# Patient Record
Sex: MALE | Race: OTHER | Employment: OTHER | URBAN - METROPOLITAN AREA
[De-identification: names, ages, dates, MRNs, and addresses within clinical notes are randomized per-mention and may not be internally consistent; named-entity substitution may affect disease eponyms.]

---

## 2021-04-08 DIAGNOSIS — Z23 ENCOUNTER FOR IMMUNIZATION: ICD-10-CM

## 2024-07-24 LAB
CREAT ?TM UR-SCNC: 115.9 UMOL/L
EXT ALBUMIN URINE RANDOM: 6.7
HBA1C MFR BLD HPLC: 7.3 %
MICROALBUMIN/CREAT UR: 6 MG/G{CREAT}

## 2024-08-14 ENCOUNTER — TELEPHONE (OUTPATIENT)
Dept: ENDOCRINOLOGY | Facility: HOSPITAL | Age: 69
End: 2024-08-14

## 2024-08-14 NOTE — TELEPHONE ENCOUNTER
I called and spoke to the patient to make him aware that he will need to bring and test results with him to his appointment next week.

## 2024-08-20 ENCOUNTER — OFFICE VISIT (OUTPATIENT)
Dept: ENDOCRINOLOGY | Facility: HOSPITAL | Age: 69
End: 2024-08-20
Payer: COMMERCIAL

## 2024-08-20 VITALS
OXYGEN SATURATION: 99 % | HEIGHT: 64 IN | DIASTOLIC BLOOD PRESSURE: 80 MMHG | WEIGHT: 156.4 LBS | SYSTOLIC BLOOD PRESSURE: 126 MMHG | BODY MASS INDEX: 26.7 KG/M2 | HEART RATE: 76 BPM

## 2024-08-20 DIAGNOSIS — E11.65 TYPE 2 DIABETES MELLITUS WITH HYPERGLYCEMIA, WITHOUT LONG-TERM CURRENT USE OF INSULIN (HCC): Primary | ICD-10-CM

## 2024-08-20 DIAGNOSIS — E05.90 SUBCLINICAL HYPERTHYROIDISM: ICD-10-CM

## 2024-08-20 DIAGNOSIS — R79.89 LOW TSH LEVEL: ICD-10-CM

## 2024-08-20 DIAGNOSIS — E66.3 OVERWEIGHT (BMI 25.0-29.9): ICD-10-CM

## 2024-08-20 PROBLEM — R73.03 PREDIABETES: Status: RESOLVED | Noted: 2024-08-20 | Resolved: 2024-08-20

## 2024-08-20 PROBLEM — R73.03 PREDIABETES: Status: ACTIVE | Noted: 2024-08-20

## 2024-08-20 PROCEDURE — 99204 OFFICE O/P NEW MOD 45 MIN: CPT | Performed by: STUDENT IN AN ORGANIZED HEALTH CARE EDUCATION/TRAINING PROGRAM

## 2024-08-20 RX ORDER — SIMVASTATIN 10 MG
10 TABLET ORAL
COMMUNITY

## 2024-08-20 RX ORDER — LISINOPRIL 10 MG/1
10 TABLET ORAL DAILY
COMMUNITY

## 2024-08-20 NOTE — PATIENT INSTRUCTIONS
Keto diet.   <45g carbs per meal.   Coconut sugar or date syrup  Reduce roti's to 2 per meal. Eat 2-3 meals a day. Avoid snacking if possible

## 2024-08-20 NOTE — PROGRESS NOTES
"    New Patient Consult Note      Chief Complaint   Patient presents with    Diabetes Mellitus      Referring Provider  Referral Self  No address on file     History of Present Illness:   Joel Waller is a 69 y.o. male with a history of T2DM since 02/24 with prediabetes prior who presents today to [facility] care    Patient was first diagnosed with prediabetes, Progressed to diabetes in 02/24  Control since diagnosis: suboptimal   Medications tried thus far: below   Current regime:- metformin 500mg BID- recently increased to BID was on daily before   BG control:- morning once a week 100-120  Hypoglycemic episodes: None  Hyperglycemia symptoms: no polyuria or polydipsia\",\"no chest pain, dyspnea or TIAs\",\"no numbness, tingling or pain in extremities\"  Diet: mostly vegetarian diet,   Activity: plays [email or web address]    Eye exam- had exam last year but unsure if had diabetic exam  Hx of hyperlipidemia: yes  Hx of hypertension: Yes  Last Lipid:- 07/24 LDL <100  Last urine microalbumin: 07/24 normal   Last hbA1C: 07/24 7.3%, 0224 7.1%     Patient also found to have low TSH with normal T4 on routine annual labs. TSH 0.28, 4 1.24 on 07/24, TSH 0.314 and Free T4 1.53 02/24. Denies any weight loss, tremors, palpitations. Denies any biotin use, diarrhea.     Social Hx:- denies smoking, alcohol use.   Family HX:- Positive in both parents and brother     Patient Active Problem List   Diagnosis    Low TSH level    Subclinical hyperthyroidism    Overweight (BMI 25.0-29.9)      History reviewed. No pertinent past medical history.   History reviewed. No pertinent surgical history.   Family History   Problem Relation Age of Onset    Diabetes Mother     Diabetes Father     Diabetes Sister     Diabetes Brother     Diabetes Brother      Social History     Tobacco Use    Smoking status: Never    Smokeless tobacco: Never   Substance Use Topics    Alcohol use: Never     No Known Allergies      Current Outpatient Medications:     lisinopril " "(ZESTRIL) 10 mg tablet, Take 10 mg by mouth daily, Disp: , Rfl:     metFORMIN (GLUCOPHAGE) 500 mg tablet, Take 2 tablets (1,000 mg total) by mouth 2 (two) times a day with meals, Disp: 400 tablet, Rfl: 1    simvastatin (ZOCOR) 10 mg tablet, Take 10 mg by mouth daily at bedtime, Disp: , Rfl:   Review of Systems   Constitutional:  Negative for diaphoresis, fatigue and unexpected weight change.   Respiratory:  Negative for shortness of breath.    Cardiovascular:  Negative for chest pain and palpitations.   Gastrointestinal:  Negative for constipation and diarrhea.   Endocrine: Negative for polydipsia and polyuria.       Physical Exam:  Body mass index is 26.85 kg/m².  /80   Pulse 76   Ht 5' 4\" (1.626 m)   Wt 70.9 kg (156 lb 6.4 oz)   SpO2 99%   BMI 26.85 kg/m²    Wt Readings from Last 3 Encounters:   08/20/24 70.9 kg (156 lb 6.4 oz)       Physical Exam  Constitutional:       Appearance: Normal appearance.   Cardiovascular:      Rate and Rhythm: Normal rate and regular rhythm.      Pulses: Normal pulses.   Pulmonary:      Effort: Pulmonary effort is normal.   Abdominal:      General: Abdomen is flat.      Palpations: Abdomen is soft.   Skin:     General: Skin is warm.      Capillary Refill: Capillary refill takes less than 2 seconds.   Neurological:      General: No focal deficit present.      Mental Status: He is alert.         Labs:   above    Impression:  1. Type 2 diabetes mellitus with hyperglycemia, without long-term current use of insulin (HCC)    2. Low TSH level    3. Subclinical hyperthyroidism    4. Overweight (BMI 25.0-29.9)           Plan:    Joel was seen today for diabetes mellitus.    Diagnoses and all orders for this visit:    Type 2 diabetes mellitus with hyperglycemia, without long-term current use of insulin (HCC)  -     Lipid panel; Future  -     Hemoglobin A1C; Future  -     Comprehensive metabolic panel; Future  -     Albumin / creatinine urine ratio; Future  -     T4, free; Future  -  "    TSH, 3rd generation; Future  -     Thyrotropin receptor antibody; Future  -     Thyroid stimulating immunoglobulin; Future    Low TSH level  -     Lipid panel; Future  -     Hemoglobin A1C; Future  -     Comprehensive metabolic panel; Future  -     Albumin / creatinine urine ratio; Future  -     T4, free; Future  -     TSH, 3rd generation; Future  -     Thyrotropin receptor antibody; Future  -     Thyroid stimulating immunoglobulin; Future    Subclinical hyperthyroidism    Overweight (BMI 25.0-29.9)    Other orders  -     metFORMIN (GLUCOPHAGE) 500 mg tablet; Take 2 tablets (1,000 mg total) by mouth 2 (two) times a day with meals      There are no diagnoses linked to this encounter.      Patient is a 69yM With PMHx of T2DM since 02/24 Without any complications with Other PMHx of overweight, low TSH Who presents today for diabetic care.     1) T2DM:- Based on patients A1C, he has T2DM since 02/24 with A1C 7.1%, reports was prediabetic before. Currently on metformin 500mg BID- started on 02/24 and dose increased recently to 500mg BID 2 weeks ago afterHis most recent A1C 07/24 has gotten higher hence recommend c/w current regime. Will refer to CDE for MNT as well. Repeat labs in 3 months for follow up. Review diabetic plate recommendations.     Plan   - c/w metformin 500mg BID   - Work on lifestyle changes   - CDE visit   - Labs in 3 months       Screening:-   Retinopathy- please make sure has dilated eye exam    Nephropathy- UTD  Lipide levels-  UTD    2) Hypertension:- BP in clinic 126/80, c/w lisinopril 10mg     3) Hyperlipidemia:- LDL at goal. C/w zocor 10mg     4) Low TSH- subclinical hyperthyroidism, clinically asymptomatic. Given TSH not persistantly <0.1 or symptomatic or hx of CVD or OP and hence does not need tx. Will obtain repeat labs with TSI/TRAb in 2 months     RTC in 3 months     Discussed with the patient and all questioned fully answered. He will call me if any problems arise.    Counseled patient  on diagnostic results, prognosis, risk and benefit of treatment options, instruction for management, importance of treatment compliance, Risk  factor reduction and impressions      Татьяна Jaeger MD

## 2024-10-25 LAB
ALBUMIN SERPL-MCNC: 4.2 G/DL (ref 3.9–4.9)
ALBUMIN/CREAT UR: <12 MG/G CREAT (ref 0–29)
ALP SERPL-CCNC: 68 IU/L (ref 44–121)
ALT SERPL-CCNC: 21 IU/L (ref 0–44)
AST SERPL-CCNC: 18 IU/L (ref 0–40)
BILIRUB SERPL-MCNC: 0.3 MG/DL (ref 0–1.2)
BUN SERPL-MCNC: 14 MG/DL (ref 8–27)
BUN/CREAT SERPL: 13 (ref 10–24)
CALCIUM SERPL-MCNC: 9.5 MG/DL (ref 8.6–10.2)
CHLORIDE SERPL-SCNC: 104 MMOL/L (ref 96–106)
CHOLEST SERPL-MCNC: 115 MG/DL (ref 100–199)
CO2 SERPL-SCNC: 22 MMOL/L (ref 20–29)
CREAT SERPL-MCNC: 1.07 MG/DL (ref 0.76–1.27)
CREAT UR-MCNC: 25.4 MG/DL
EGFR: 76 ML/MIN/1.73
GLOBULIN SER-MCNC: 2.4 G/DL (ref 1.5–4.5)
GLUCOSE SERPL-MCNC: 95 MG/DL (ref 70–99)
HBA1C MFR BLD: 6.5 % (ref 4.8–5.6)
HDLC SERPL-MCNC: 54 MG/DL
LDLC SERPL CALC-MCNC: 47 MG/DL (ref 0–99)
MICROALBUMIN UR-MCNC: <3 UG/ML
POTASSIUM SERPL-SCNC: 5 MMOL/L (ref 3.5–5.2)
PROT SERPL-MCNC: 6.6 G/DL (ref 6–8.5)
SL AMB VLDL CHOLESTEROL CALC: 14 MG/DL (ref 5–40)
SODIUM SERPL-SCNC: 139 MMOL/L (ref 134–144)
T4 FREE SERPL-MCNC: 1.47 NG/DL (ref 0.82–1.77)
TRIGL SERPL-MCNC: 63 MG/DL (ref 0–149)
TSH RECEP AB SER-ACNC: <1.1 IU/L (ref 0–1.75)
TSH SERPL DL<=0.005 MIU/L-ACNC: 0.41 UIU/ML (ref 0.45–4.5)
TSI SER-ACNC: <0.1 IU/L (ref 0–0.55)

## 2024-10-29 ENCOUNTER — OFFICE VISIT (OUTPATIENT)
Dept: ENDOCRINOLOGY | Facility: HOSPITAL | Age: 69
End: 2024-10-29
Payer: COMMERCIAL

## 2024-10-29 VITALS
BODY MASS INDEX: 26.12 KG/M2 | HEART RATE: 76 BPM | HEIGHT: 64 IN | SYSTOLIC BLOOD PRESSURE: 122 MMHG | OXYGEN SATURATION: 96 % | DIASTOLIC BLOOD PRESSURE: 78 MMHG | WEIGHT: 153 LBS

## 2024-10-29 DIAGNOSIS — E11.65 TYPE 2 DIABETES MELLITUS WITH HYPERGLYCEMIA, WITHOUT LONG-TERM CURRENT USE OF INSULIN (HCC): Primary | ICD-10-CM

## 2024-10-29 DIAGNOSIS — E05.90 SUBCLINICAL HYPERTHYROIDISM: ICD-10-CM

## 2024-10-29 PROCEDURE — 99214 OFFICE O/P EST MOD 30 MIN: CPT | Performed by: PHYSICIAN ASSISTANT

## 2024-10-29 RX ORDER — CETIRIZINE HYDROCHLORIDE 10 MG/1
TABLET ORAL
COMMUNITY
Start: 2024-10-01

## 2024-10-29 RX ORDER — LANCETS 33 GAUGE
EACH MISCELLANEOUS
COMMUNITY
Start: 2024-10-02

## 2024-10-29 RX ORDER — BLOOD-GLUCOSE METER
EACH MISCELLANEOUS
COMMUNITY
Start: 2024-10-02

## 2024-10-29 RX ORDER — DORZOLAMIDE HCL 20 MG/ML
SOLUTION/ DROPS OPHTHALMIC
COMMUNITY
Start: 2024-09-21

## 2024-10-29 RX ORDER — BLOOD SUGAR DIAGNOSTIC
STRIP MISCELLANEOUS
COMMUNITY
Start: 2024-10-27

## 2024-10-29 RX ORDER — MELOXICAM 15 MG/1
15 TABLET ORAL DAILY PRN
COMMUNITY
Start: 2024-10-28

## 2024-10-29 NOTE — PROGRESS NOTES
Joel Waller 69 y.o. male MRN: [id number]    Encounter: [id number]      Assessment & Plan     Assessment:  This is a 69 y.o.-year-old male with type 2 diabetes with hypertension and hyperlipidemia, and subclinical hyperthyroidism.    Plan:  1.  Type 2 diabetes: Recent hemoglobin A1c has improved to 6.5.  He is now at target range.  At this time I like him to continue with metformin 500 mg twice a day.  If A1c is excellent at next office visit, we will likely start backing off on the metformin.  Continue with lifestyle modifications to help improve glucose levels.  Check blood sugars at least daily.  Contact the office with any concerns or questions.  Patient is requesting a 4-month follow-up which I am fine with at this time.    2.  Subclinical hyperthyroidism: Most recent thyroid lab work came back with a slightly low TSH but annelise T4.  Need for medication at this time.  We will continue to monitor.    3.  Hypertension: Normotensive in office today.  Kidney function remained stable with normal electrolytes.  Continue with lisinopril 10 mg daily.  Will repeat CMP prior to next office visit.    4.  Hyperlipidemia: Lipid panel excellent at this time.  Continue with simvastatin 10 mg daily.  We will continue to monitor over time.    CC: Diabetes    History of Present Illness     HPI:  Joel Waller is a 69 y.o. year old male with type 2 diabetes diagnosed February 2024.  He is on oral agents at home and takes metformin 500 mg twice a day. He denies any polyuria, polydipsia, nocturia and blurry vision.  He denies neuropathy, nephropathy, retinopathy, heart attack, stroke, and claudication.  Initial concern for increase in glucose levels and A1c was likely due to recent knee surgery.  Was not as physically active.  Since his recovery has increased physical activity and is much more active at this time.  The vegetarian diet and tries to limit his carbs.    Hypoglycemic episodes: No.     Diabetic eye exam on file.  Diabetic  foot exam has not been completed yet.    Blood Sugar/Glucometer/Pump/CGM review: States when he does check blood sugars are typically in the low 100s.    For hypertension he is currently on lisinopril 10 mg daily.  For hyperlipidemia he is currently on simvastatin 10 mg daily.  Denies any headaches, vision change, chest pain, MI or strokelike symptoms.     Review of Systems   Constitutional:  Negative for activity change, appetite change, fatigue and unexpected weight change.   HENT:  Negative for trouble swallowing.    Eyes:  Negative for visual disturbance.   Respiratory:  Negative for chest tightness and shortness of breath.    Cardiovascular:  Negative for chest pain, palpitations and leg swelling.   Gastrointestinal:  Negative for abdominal pain, diarrhea, nausea and vomiting.   Endocrine: Negative for cold intolerance, heat intolerance, polydipsia, polyphagia and polyuria.   Genitourinary:  Negative for frequency.   Skin:  Negative for rash and wound.   Neurological:  Negative for dizziness, weakness, light-headedness, numbness and headaches.   Psychiatric/Behavioral:  Negative for dysphoric mood and sleep disturbance. The patient is not nervous/anxious.        Historical Information   No past medical history on file.  No past surgical history on file.  Social History   Social History     Substance and Sexual Activity   Alcohol Use Never     Social History     Substance and Sexual Activity   Drug Use Never     Social History     Tobacco Use   Smoking Status Never   Smokeless Tobacco Never     Family History:   Family History   Problem Relation Age of Onset    Diabetes Mother     Diabetes Father     Diabetes Sister     Diabetes Brother     Diabetes Brother        Meds/Allergies   Current Outpatient Medications   Medication Sig Dispense Refill    lisinopril (ZESTRIL) 10 mg tablet Take 10 mg by mouth daily      metFORMIN (GLUCOPHAGE) 500 mg tablet Take 1 tablet (500 mg total) by mouth 2 (two) times a day with  "meals 200 tablet 1    simvastatin (ZOCOR) 10 mg tablet Take 10 mg by mouth daily at bedtime       No current facility-administered medications for this visit.     No Known Allergies    Objective   Vitals: Height 5' 4\" (1.626 m), weight 69.4 kg (153 lb).    Physical Exam  Vitals and nursing note reviewed.   Constitutional:       General: He is not in acute distress.     Appearance: Normal appearance. He is not diaphoretic.   HENT:      Head: Normocephalic and atraumatic.   Eyes:      General: No scleral icterus.     Extraocular Movements: Extraocular movements intact.      Conjunctiva/sclera: Conjunctivae normal.      Pupils: Pupils are equal, round, and reactive to light.   Cardiovascular:      Rate and Rhythm: Normal rate and regular rhythm.      Heart sounds: No murmur heard.  Pulmonary:      Effort: Pulmonary effort is normal. No respiratory distress.      Breath sounds: Normal breath sounds. No wheezing.   Musculoskeletal:      Right lower leg: No edema.      Left lower leg: No edema.   Lymphadenopathy:      Cervical: No cervical adenopathy.   Skin:     General: Skin is warm and dry.   Neurological:      Mental Status: He is alert and oriented to person, place, and time. Mental status is at baseline.      Sensory: No sensory deficit.      Gait: Gait normal.   Psychiatric:         Mood and Affect: Mood normal.         Behavior: Behavior normal.         Thought Content: Thought content normal.         The history was obtained from the review of the chart, patient.    Lab Results:   Lab Results   Component Value Date/Time    Hemoglobin A1C 6.5 (H) 10/23/2024 08:54 AM    Hemoglobin A1C 7.3 07/24/2024 12:00 AM    BUN 14 10/23/2024 08:54 AM    Potassium 5.0 10/23/2024 08:54 AM    Chloride 104 10/23/2024 08:54 AM    CO2 22 10/23/2024 08:54 AM    Creatinine 1.07 10/23/2024 08:54 AM    AST 18 10/23/2024 08:54 AM    ALT 21 10/23/2024 08:54 AM    Protein, Total 6.6 10/23/2024 08:54 AM    Albumin 4.2 10/23/2024 08:54 AM    " "Globulin, Total 2.4 10/23/2024 08:54 AM    HDL 54 10/23/2024 08:54 AM    Triglycerides 63 10/23/2024 08:54 AM           Imaging Studies: Results Review Statement: No pertinent imaging studies reviewed.    Portions of the record may have been created with voice recognition software. Occasional wrong word or \"sound a like\" substitutions may have occurred due to the inherent limitations of voice recognition software. Read the chart carefully and recognize, using context, where substitutions have occurred.    "

## 2024-10-29 NOTE — PATIENT INSTRUCTIONS
Continue with metformin 500 mg twice a day.    Continue with lifestyle to help control blood sugars.    No need for thyroid medications.    Call with any concerns or questions.    Follow up in 3-4 months.

## 2025-03-01 LAB
ALBUMIN SERPL-MCNC: 3.9 G/DL (ref 3.9–4.9)
ALP SERPL-CCNC: 53 IU/L (ref 44–121)
ALT SERPL-CCNC: 24 IU/L (ref 0–44)
AST SERPL-CCNC: 22 IU/L (ref 0–40)
BILIRUB SERPL-MCNC: 0.3 MG/DL (ref 0–1.2)
BUN SERPL-MCNC: 12 MG/DL (ref 8–27)
BUN/CREAT SERPL: 15 (ref 10–24)
CALCIUM SERPL-MCNC: 9.1 MG/DL (ref 8.6–10.2)
CHLORIDE SERPL-SCNC: 105 MMOL/L (ref 96–106)
CO2 SERPL-SCNC: 23 MMOL/L (ref 20–29)
CREAT SERPL-MCNC: 0.79 MG/DL (ref 0.76–1.27)
EGFR: 96 ML/MIN/1.73
GLOBULIN SER-MCNC: 2.6 G/DL (ref 1.5–4.5)
GLUCOSE SERPL-MCNC: 85 MG/DL (ref 70–99)
HBA1C MFR BLD: 6.4 % (ref 4.8–5.6)
POTASSIUM SERPL-SCNC: 4.7 MMOL/L (ref 3.5–5.2)
PROT SERPL-MCNC: 6.5 G/DL (ref 6–8.5)
SODIUM SERPL-SCNC: 143 MMOL/L (ref 134–144)
T3FREE SERPL-MCNC: 3.4 PG/ML (ref 2–4.4)
T4 FREE SERPL-MCNC: 1.58 NG/DL (ref 0.82–1.77)
TSH SERPL DL<=0.005 MIU/L-ACNC: 0.29 UIU/ML (ref 0.45–4.5)

## 2025-03-04 ENCOUNTER — RESULTS FOLLOW-UP (OUTPATIENT)
Dept: ENDOCRINOLOGY | Facility: HOSPITAL | Age: 70
End: 2025-03-04

## 2025-03-04 NOTE — PROGRESS NOTES
"    New Patient Consult Note      Chief Complaint   Patient presents with    Diabetes Type 2      Referring Provider  No referring provider defined for this encounter.     History of Present Illness:   Joel Waller is a 70 y.o. male with a history of T2DM since 02/24 with prediabetes prior who presents today to for follow up. Last visit 10/24    Patient was first diagnosed with prediabetes, Progressed to diabetes in 02/24  Control since diagnosis: suboptimal   Medications tried thus far: below   Current regime:- metformin 500mg BID  BG control:- Doesn't check BG  Hypoglycemic episodes: None  Hyperglycemia symptoms: no polyuria or polydipsia\",\"no chest pain, dyspnea or TIAs\",\"no numbness, tingling or pain in extremities\"  Diet: mostly vegetarian diet,   Activity: plays [employer], went to [place] recently and won a [employer]    Eye exam- had exam last year but unsure if had diabetic exam  Hx of hyperlipidemia: yes  Hx of hypertension: Yes  Last Lipid:- 10/24 LDL <100  Last urine microalbumin:10/24 normal   Last hbA1C: 2/25 6.4%, 07/24 7.3%, 0224 7.1%     Patient also found to have low TSH with normal T4 on routine annual labs. TSH 0.28, 4 1.24 on 07/24, TSH 0.314 and Free T4 1.53 02/24. TRAb, TSI neg. Most recent TSH 0.2 with t4 1.58. does report some changes in voice. But denies dysphagia    Social Hx:- denies smoking, alcohol use.   Family HX:- Positive in both parents and brother     Patient Active Problem List   Diagnosis    Low TSH level    Subclinical hyperthyroidism    Overweight (BMI 25.0-29.9)      History reviewed. No pertinent past medical history.   History reviewed. No pertinent surgical history.   Family History   Problem Relation Age of Onset    Diabetes Mother     Diabetes Father     Diabetes Sister     Diabetes Brother     Diabetes Brother      Social History     Tobacco Use    Smoking status: Never    Smokeless tobacco: Never   Substance Use Topics    Alcohol use: Never     No Known " "Allergies      Current Outpatient Medications:     Blood Glucose Monitoring Suppl (ONE TOUCH ULTRA 2) w/Device KIT, CHECK SUGARS TWICE A DAY, Disp: , Rfl:     cetirizine (ZyrTEC) 10 mg tablet, prn, Disp: , Rfl:     dorzolamide (TRUSOPT) 2 % ophthalmic solution, INSTILL ONE DROP IN BOTH EYES 2 TIMES A DAY, Disp: , Rfl:     Lancets (OneTouch Delica Plus [id number]) MISC, USE TO CHECK BLOOD SUGAR ONCE DAILY, Disp: , Rfl:     lisinopril (ZESTRIL) 10 mg tablet, Take 10 mg by mouth daily, Disp: , Rfl:     meloxicam (MOBIC) 15 mg tablet, Take 15 mg by mouth daily as needed, Disp: , Rfl:     metFORMIN (GLUCOPHAGE) 500 mg tablet, Take 1 tablet (500 mg total) by mouth 2 (two) times a day with meals, Disp: 200 tablet, Rfl: 1    OneTouch Ultra test strip, USE CHECK SUGARS ONCE DAILY - PART B COVERED, Disp: , Rfl:     simvastatin (ZOCOR) 10 mg tablet, Take 10 mg by mouth daily at bedtime, Disp: , Rfl:   Review of Systems   Constitutional:  Negative for diaphoresis, fatigue and unexpected weight change.   Respiratory:  Negative for shortness of breath.    Cardiovascular:  Negative for chest pain and palpitations.   Gastrointestinal:  Negative for constipation and diarrhea.   Endocrine: Negative for polydipsia and polyuria.       Physical Exam:  Body mass index is 25.27 kg/m².  /80   Pulse 76   Ht 5' 4\" (1.626 m)   Wt 66.8 kg (147 lb 3.2 oz)   BMI 25.27 kg/m²    Wt Readings from Last 3 Encounters:   03/06/25 66.8 kg (147 lb 3.2 oz)   10/29/24 69.4 kg (153 lb)   08/20/24 70.9 kg (156 lb 6.4 oz)       Physical Exam  Constitutional:       Appearance: Normal appearance.   Cardiovascular:      Rate and Rhythm: Normal rate and regular rhythm.      Pulses: Normal pulses.   Pulmonary:      Effort: Pulmonary effort is normal.   Abdominal:      General: Abdomen is flat.      Palpations: Abdomen is soft.   Skin:     General: Skin is warm.      Capillary Refill: Capillary refill takes less than 2 seconds.   Neurological:      " General: No focal deficit present.      Mental Status: He is alert.         Labs:    Latest Reference Range & Units 10/23/24 08:54 02/28/25 09:05   Hemoglobin A1C 4.8 - 5.6 % 6.5 (H) 6.4 (H)   CALCIUM 8.6 - 10.2 mg/dL 9.5 9.1   TSH, POC 0.450 - 4.500 uIU/mL 0.405 (L) 0.291 (L)   FREE T4 0.82 - 1.77 ng/dL 1.47 1.58   Thyrotropin Receptor Ab 0.00 - 1.75 IU/L <1.10    THYROID STIMULATING IMMUNOGLOBULIN 0.00 - 0.55 IU/L <0.10    EXT Creatinine Urine Not Estab. mg/dL 25.4    Albumin Creat Ratio 0 - 29 mg/g creat <12    Albumin,U,Random Not Estab. ug/mL <3.0    (H): Data is abnormally high  (L): Data is abnormally low    Impression:  1. Type 2 diabetes mellitus with hyperglycemia, without long-term current use of insulin (HCC)    2. Subclinical hyperthyroidism    3. Overweight (BMI 25.0-29.9)    4. At high risk for osteoporosis             Plan:    Joel was seen today for diabetes type 2.    Diagnoses and all orders for this visit:    Type 2 diabetes mellitus with hyperglycemia, without long-term current use of insulin ([facility])  -     T4, free  -     T3, free; Future  -     TSH, 3rd generation  -     Lipid panel; Future  -     Hemoglobin A1C; Future  -     Comprehensive metabolic panel; Future  -     Albumin / creatinine urine ratio; Future    Subclinical hyperthyroidism  -     T4, free  -     T3, free; Future  -     TSH, 3rd generation  -     Lipid panel; Future  -     Hemoglobin A1C; Future  -     Comprehensive metabolic panel; Future  -     Albumin / creatinine urine ratio; Future    Overweight (BMI 25.0-29.9)  -     T4, free  -     T3, free; Future  -     TSH, 3rd generation  -     Lipid panel; Future  -     Hemoglobin A1C; Future  -     Comprehensive metabolic panel; Future  -     Albumin / creatinine urine ratio; Future    At high risk for osteoporosis        There are no diagnoses linked to this encounter.      Patient is a 69yM With PMHx of T2DM since 02/24 Without any complications with Other PMHx of overweight,  low TSH Who presents today for diabetic care.     1) T2DM:- Based on patients A1C, he has T2DM since 02/24 with A1C 7.1%, reports was prediabetic before. Currently on metformin 500mg BID- started on 02/24 since doing this and working on lifestyle changes his diabetes has improved to prediabetes range with A1C 6.4% now. Discussed given this would not adjust his regime and continue to monitor in 6 months now.     Plan   - c/w metformin 500mg BID   - Work on lifestyle changes   - Labs in 6 months     Screening:-   Retinopathy- please make sure has dilated eye exam    Nephropathy- UTD  Lipide levels-  UTD    2) Hypertension:- BP in clinic 124/80 , c/w lisinopril 10mg     3) Hyperlipidemia:- LDL at goal. C/w zocor 10mg     4) Low TSH- subclinical hyperthyroidism, clinically asymptomatic. Given TSH not persistantly <0.1 or symptomatic or hx of CVD or OP and hence does not need tx. Simply trend for now. Wanted to obtain DXA d/t high risk but on medicare and thus this is not approved. Will obtain US thyroid d/t change in voice reported     RTC in 6 months     Discussed with the patient and all questioned fully answered. He will call me if any problems arise.    Counseled patient on diagnostic results, prognosis, risk and benefit of treatment options, instruction for management, importance of treatment compliance, Risk  factor reduction and impressions      Татьяна Jaeger MD

## 2025-03-06 ENCOUNTER — OFFICE VISIT (OUTPATIENT)
Dept: ENDOCRINOLOGY | Facility: HOSPITAL | Age: 70
End: 2025-03-06
Payer: COMMERCIAL

## 2025-03-06 VITALS
HEIGHT: 64 IN | WEIGHT: 147.2 LBS | DIASTOLIC BLOOD PRESSURE: 80 MMHG | BODY MASS INDEX: 25.13 KG/M2 | SYSTOLIC BLOOD PRESSURE: 124 MMHG | HEART RATE: 76 BPM

## 2025-03-06 DIAGNOSIS — E66.3 OVERWEIGHT (BMI 25.0-29.9): ICD-10-CM

## 2025-03-06 DIAGNOSIS — E11.65 TYPE 2 DIABETES MELLITUS WITH HYPERGLYCEMIA, WITHOUT LONG-TERM CURRENT USE OF INSULIN (HCC): Primary | ICD-10-CM

## 2025-03-06 DIAGNOSIS — E05.90 SUBCLINICAL HYPERTHYROIDISM: ICD-10-CM

## 2025-03-06 DIAGNOSIS — Z91.89 AT HIGH RISK FOR OSTEOPOROSIS: ICD-10-CM

## 2025-03-06 PROCEDURE — 99214 OFFICE O/P EST MOD 30 MIN: CPT | Performed by: STUDENT IN AN ORGANIZED HEALTH CARE EDUCATION/TRAINING PROGRAM

## 2025-03-06 PROCEDURE — G2211 COMPLEX E/M VISIT ADD ON: HCPCS | Performed by: STUDENT IN AN ORGANIZED HEALTH CARE EDUCATION/TRAINING PROGRAM

## 2025-03-11 ENCOUNTER — HOSPITAL ENCOUNTER (OUTPATIENT)
Dept: ULTRASOUND IMAGING | Facility: HOSPITAL | Age: 70
Discharge: HOME | End: 2025-03-11
Attending: STUDENT IN AN ORGANIZED HEALTH CARE EDUCATION/TRAINING PROGRAM
Payer: COMMERCIAL

## 2025-03-11 DIAGNOSIS — E05.90 SUBCLINICAL HYPERTHYROIDISM: ICD-10-CM

## 2025-03-11 PROCEDURE — 76536 US EXAM OF HEAD AND NECK: CPT

## 2025-03-17 ENCOUNTER — RESULTS FOLLOW-UP (OUTPATIENT)
Dept: ENDOCRINOLOGY | Facility: HOSPITAL | Age: 70
End: 2025-03-17